# Patient Record
Sex: FEMALE | Race: WHITE | Employment: UNEMPLOYED | URBAN - METROPOLITAN AREA
[De-identification: names, ages, dates, MRNs, and addresses within clinical notes are randomized per-mention and may not be internally consistent; named-entity substitution may affect disease eponyms.]

---

## 2018-07-18 PROBLEM — N39.0 RECURRENT UTI: Status: ACTIVE | Noted: 2018-07-18

## 2018-07-18 PROBLEM — K59.00 CONSTIPATION, UNSPECIFIED CONSTIPATION TYPE: Status: ACTIVE | Noted: 2018-07-18

## 2018-08-06 PROCEDURE — 81015 MICROSCOPIC EXAM OF URINE: CPT | Performed by: PHYSICIAN ASSISTANT

## 2018-12-19 ENCOUNTER — ANESTHESIA EVENT (OUTPATIENT)
Dept: SURGERY | Facility: HOSPITAL | Age: 23
End: 2018-12-19

## 2018-12-19 ENCOUNTER — ANESTHESIA (OUTPATIENT)
Dept: SURGERY | Facility: HOSPITAL | Age: 23
End: 2018-12-19

## 2018-12-19 ENCOUNTER — HOSPITAL ENCOUNTER (INPATIENT)
Facility: HOSPITAL | Age: 23
LOS: 2 days | Discharge: HOME | DRG: 661 | End: 2018-12-21
Attending: UROLOGY | Admitting: UROLOGY
Payer: MEDICAID

## 2018-12-19 ENCOUNTER — APPOINTMENT (OUTPATIENT)
Dept: GENERAL RADIOLOGY | Facility: HOSPITAL | Age: 23
DRG: 661 | End: 2018-12-19
Attending: UROLOGY
Payer: MEDICAID

## 2018-12-19 DIAGNOSIS — N13.5 UPJ (URETEROPELVIC JUNCTION) OBSTRUCTION: ICD-10-CM

## 2018-12-19 LAB
ANTIBODY SCREEN: NEGATIVE
POCT LOT NUMBER: NORMAL
POCT URINE PREGNANCY: NEGATIVE
RH BLOOD TYPE: POSITIVE

## 2018-12-19 PROCEDURE — 0TJB8ZZ INSPECTION OF BLADDER, VIA NATURAL OR ARTIFICIAL OPENING ENDOSCOPIC: ICD-10-PCS | Performed by: UROLOGY

## 2018-12-19 PROCEDURE — 74420 UROGRAPHY RTRGR +-KUB: CPT | Performed by: UROLOGY

## 2018-12-19 PROCEDURE — 0TQ34ZZ REPAIR RIGHT KIDNEY PELVIS, PERCUTANEOUS ENDOSCOPIC APPROACH: ICD-10-PCS | Performed by: UROLOGY

## 2018-12-19 PROCEDURE — 0T764DZ DILATION OF RIGHT URETER WITH INTRALUMINAL DEVICE, PERCUTANEOUS ENDOSCOPIC APPROACH: ICD-10-PCS | Performed by: UROLOGY

## 2018-12-19 PROCEDURE — 86850 RBC ANTIBODY SCREEN: CPT | Performed by: UROLOGY

## 2018-12-19 PROCEDURE — 86900 BLOOD TYPING SEROLOGIC ABO: CPT | Performed by: UROLOGY

## 2018-12-19 PROCEDURE — 8E0W4CZ ROBOTIC ASSISTED PROCEDURE OF TRUNK REGION, PERCUTANEOUS ENDOSCOPIC APPROACH: ICD-10-PCS | Performed by: UROLOGY

## 2018-12-19 PROCEDURE — 81025 URINE PREGNANCY TEST: CPT | Performed by: UROLOGY

## 2018-12-19 PROCEDURE — 86901 BLOOD TYPING SEROLOGIC RH(D): CPT | Performed by: UROLOGY

## 2018-12-19 DEVICE — STENT URET 6F 24CM ULSMTH: Type: IMPLANTABLE DEVICE | Site: URETER | Status: FUNCTIONAL

## 2018-12-19 RX ORDER — ONDANSETRON 2 MG/ML
4 INJECTION INTRAMUSCULAR; INTRAVENOUS AS NEEDED
Status: DISCONTINUED | OUTPATIENT
Start: 2018-12-19 | End: 2018-12-19 | Stop reason: HOSPADM

## 2018-12-19 RX ORDER — HYDROMORPHONE HYDROCHLORIDE 1 MG/ML
INJECTION, SOLUTION INTRAMUSCULAR; INTRAVENOUS; SUBCUTANEOUS
Status: COMPLETED
Start: 2018-12-19 | End: 2018-12-19

## 2018-12-19 RX ORDER — ONDANSETRON 2 MG/ML
4 INJECTION INTRAMUSCULAR; INTRAVENOUS EVERY 6 HOURS PRN
Status: DISCONTINUED | OUTPATIENT
Start: 2018-12-19 | End: 2018-12-21

## 2018-12-19 RX ORDER — TRAMADOL HYDROCHLORIDE 50 MG/1
50 TABLET ORAL EVERY 6 HOURS PRN
Status: DISCONTINUED | OUTPATIENT
Start: 2018-12-19 | End: 2018-12-21

## 2018-12-19 RX ORDER — CEFAZOLIN SODIUM/WATER 2 G/20 ML
2 SYRINGE (ML) INTRAVENOUS EVERY 8 HOURS
Status: COMPLETED | OUTPATIENT
Start: 2018-12-20 | End: 2018-12-20

## 2018-12-19 RX ORDER — ENOXAPARIN SODIUM 100 MG/ML
40 INJECTION SUBCUTANEOUS DAILY
Status: DISCONTINUED | OUTPATIENT
Start: 2018-12-20 | End: 2018-12-21

## 2018-12-19 RX ORDER — HYDROCODONE BITARTRATE AND ACETAMINOPHEN 5; 325 MG/1; MG/1
1 TABLET ORAL AS NEEDED
Status: DISCONTINUED | OUTPATIENT
Start: 2018-12-19 | End: 2018-12-19 | Stop reason: HOSPADM

## 2018-12-19 RX ORDER — HYDROCODONE BITARTRATE AND ACETAMINOPHEN 5; 325 MG/1; MG/1
2 TABLET ORAL AS NEEDED
Status: DISCONTINUED | OUTPATIENT
Start: 2018-12-19 | End: 2018-12-19 | Stop reason: HOSPADM

## 2018-12-19 RX ORDER — NALOXONE HYDROCHLORIDE 0.4 MG/ML
80 INJECTION, SOLUTION INTRAMUSCULAR; INTRAVENOUS; SUBCUTANEOUS AS NEEDED
Status: DISCONTINUED | OUTPATIENT
Start: 2018-12-19 | End: 2018-12-19 | Stop reason: HOSPADM

## 2018-12-19 RX ORDER — ACETAMINOPHEN 325 MG/1
650 TABLET ORAL EVERY 4 HOURS PRN
Status: DISCONTINUED | OUTPATIENT
Start: 2018-12-19 | End: 2018-12-21

## 2018-12-19 RX ORDER — ACETAMINOPHEN 500 MG
500 TABLET ORAL EVERY 6 HOURS PRN
COMMUNITY
End: 2019-01-24

## 2018-12-19 RX ORDER — DOCUSATE SODIUM 100 MG/1
100 CAPSULE, LIQUID FILLED ORAL 2 TIMES DAILY
Status: DISCONTINUED | OUTPATIENT
Start: 2018-12-19 | End: 2018-12-21

## 2018-12-19 RX ORDER — MORPHINE SULFATE 4 MG/ML
4 INJECTION, SOLUTION INTRAMUSCULAR; INTRAVENOUS EVERY 2 HOUR PRN
Status: DISCONTINUED | OUTPATIENT
Start: 2018-12-19 | End: 2018-12-21

## 2018-12-19 RX ORDER — CEFAZOLIN SODIUM/WATER 2 G/20 ML
2 SYRINGE (ML) INTRAVENOUS ONCE
Status: COMPLETED | OUTPATIENT
Start: 2018-12-19 | End: 2018-12-19

## 2018-12-19 RX ORDER — ACETAMINOPHEN 10 MG/ML
15 INJECTION, SOLUTION INTRAVENOUS EVERY 6 HOURS PRN
Status: DISCONTINUED | OUTPATIENT
Start: 2018-12-19 | End: 2018-12-19 | Stop reason: HOSPADM

## 2018-12-19 RX ORDER — MORPHINE SULFATE 4 MG/ML
2 INJECTION, SOLUTION INTRAMUSCULAR; INTRAVENOUS EVERY 2 HOUR PRN
Status: DISCONTINUED | OUTPATIENT
Start: 2018-12-19 | End: 2018-12-21

## 2018-12-19 RX ORDER — ONDANSETRON 4 MG/1
4 TABLET, FILM COATED ORAL EVERY 6 HOURS PRN
Status: DISCONTINUED | OUTPATIENT
Start: 2018-12-19 | End: 2018-12-21

## 2018-12-19 RX ORDER — ACETAMINOPHEN 500 MG
1000 TABLET ORAL ONCE
Status: DISCONTINUED | OUTPATIENT
Start: 2018-12-19 | End: 2018-12-19

## 2018-12-19 RX ORDER — HEPARIN SODIUM 5000 [USP'U]/ML
5000 INJECTION, SOLUTION INTRAVENOUS; SUBCUTANEOUS ONCE
Status: COMPLETED | OUTPATIENT
Start: 2018-12-19 | End: 2018-12-19

## 2018-12-19 RX ORDER — ACETAMINOPHEN 10 MG/ML
INJECTION, SOLUTION INTRAVENOUS
Status: COMPLETED
Start: 2018-12-19 | End: 2018-12-19

## 2018-12-19 RX ORDER — SODIUM CHLORIDE 9 MG/ML
INJECTION, SOLUTION INTRAVENOUS CONTINUOUS
Status: DISCONTINUED | OUTPATIENT
Start: 2018-12-19 | End: 2018-12-21

## 2018-12-19 RX ORDER — HYDROMORPHONE HYDROCHLORIDE 1 MG/ML
0.4 INJECTION, SOLUTION INTRAMUSCULAR; INTRAVENOUS; SUBCUTANEOUS EVERY 5 MIN PRN
Status: DISCONTINUED | OUTPATIENT
Start: 2018-12-19 | End: 2018-12-19 | Stop reason: HOSPADM

## 2018-12-19 RX ORDER — SODIUM CHLORIDE, SODIUM LACTATE, POTASSIUM CHLORIDE, CALCIUM CHLORIDE 600; 310; 30; 20 MG/100ML; MG/100ML; MG/100ML; MG/100ML
INJECTION, SOLUTION INTRAVENOUS CONTINUOUS
Status: DISCONTINUED | OUTPATIENT
Start: 2018-12-19 | End: 2018-12-19

## 2018-12-19 RX ORDER — SODIUM CHLORIDE, SODIUM LACTATE, POTASSIUM CHLORIDE, CALCIUM CHLORIDE 600; 310; 30; 20 MG/100ML; MG/100ML; MG/100ML; MG/100ML
INJECTION, SOLUTION INTRAVENOUS CONTINUOUS
Status: DISCONTINUED | OUTPATIENT
Start: 2018-12-19 | End: 2018-12-19 | Stop reason: HOSPADM

## 2018-12-19 NOTE — ANESTHESIA PREPROCEDURE EVALUATION
PRE-OP EVALUATION    Patient Name: Millie Horowitz    Pre-op Diagnosis: UPJ (ureteropelvic junction) obstruction [N13.5]    Procedure(s):  XI ROBOTIC ASSISTED LAPAROSCOPIC RIGHT PYELOPLASTY, CYSTOSCOPY,   RIGHT RETROGRADE PYELOGRAM, RIGHT URETERAL S exam normal.                 Other findings            ASA: 1   Plan: general  NPO status verified and patient meets guidelines. Patient has not taken beta blockers in last 24 hours. Comment: Plan for general anesthetic with endotracheal tube.   Risks

## 2018-12-19 NOTE — H&P
[facility] Urology  H&P Note    Shanika Michael Patient Status:  Surgery Admit    1995 MRN [id number]   Location [address] [place] [address]ING Attending Kevan Cintron MD   Hosp Day # 0 PCP RACHEL Baumann DO     CC: right kg/m²   GENERAL: well developed, well nourished, no apparent distress  EYES: sclera non-icteric, no redness   MOUTH:  moist oral mucosa, no lesions  LUNGS: normal respiratory motion without distress  GI: Abdomen soft without organomegally, no tenderness, n

## 2018-12-20 LAB
ANION GAP SERPL CALC-SCNC: 9 MMOL/L (ref 0–18)
BUN BLD-MCNC: 8 MG/DL (ref 8–20)
BUN/CREAT SERPL: 16 (ref 10–20)
CALCIUM BLD-MCNC: 7.9 MG/DL (ref 8.3–10.3)
CHLORIDE SERPL-SCNC: 108 MMOL/L (ref 101–111)
CO2 SERPL-SCNC: 18 MMOL/L (ref 22–32)
CREAT BLD-MCNC: 0.5 MG/DL (ref 0.55–1.02)
ERYTHROCYTE [DISTWIDTH] IN BLOOD BY AUTOMATED COUNT: 11.5 % (ref 11.5–16)
GLUCOSE BLD-MCNC: 119 MG/DL (ref 70–99)
HCT VFR BLD AUTO: 30.6 % (ref 34–50)
HGB BLD-MCNC: 10.9 G/DL (ref 12–16)
MCH RBC QN AUTO: 31.9 PG (ref 27–33.2)
MCHC RBC AUTO-ENTMCNC: 35.6 G/DL (ref 31–37)
MCV RBC AUTO: 89.5 FL (ref 81–100)
OSMOLALITY SERPL CALC.SUM OF ELEC: 279 MOSM/KG (ref 275–295)
PLATELET # BLD AUTO: 170 10(3)UL (ref 150–450)
POTASSIUM SERPL-SCNC: 4 MMOL/L (ref 3.6–5.1)
RBC # BLD AUTO: 3.42 X10(6)UL (ref 3.8–5.1)
RED CELL DISTRIBUTION WIDTH-SD: 37.1 FL (ref 35.1–46.3)
SODIUM SERPL-SCNC: 135 MMOL/L (ref 136–144)
WBC # BLD AUTO: 6.3 X10(3) UL (ref 4–13)

## 2018-12-20 PROCEDURE — 80048 BASIC METABOLIC PNL TOTAL CA: CPT | Performed by: UROLOGY

## 2018-12-20 PROCEDURE — 85027 COMPLETE CBC AUTOMATED: CPT | Performed by: UROLOGY

## 2018-12-20 RX ORDER — TRAMADOL HYDROCHLORIDE 50 MG/1
50 TABLET ORAL EVERY 6 HOURS PRN
Qty: 20 TABLET | Refills: 0 | Status: SHIPPED | OUTPATIENT
Start: 2018-12-20 | End: 2018-12-21

## 2018-12-20 RX ORDER — PHENAZOPYRIDINE HYDROCHLORIDE 100 MG/1
100 TABLET, FILM COATED ORAL
Status: DISCONTINUED | OUTPATIENT
Start: 2018-12-20 | End: 2018-12-21

## 2018-12-20 RX ORDER — OXYBUTYNIN CHLORIDE 5 MG/1
5 TABLET ORAL 3 TIMES DAILY
Status: DISCONTINUED | OUTPATIENT
Start: 2018-12-20 | End: 2018-12-21

## 2018-12-20 RX ORDER — PHENAZOPYRIDINE HYDROCHLORIDE 100 MG/1
100 TABLET, FILM COATED ORAL
Qty: 15 TABLET | Refills: 1 | Status: SHIPPED | OUTPATIENT
Start: 2018-12-20 | End: 2019-01-24

## 2018-12-20 RX ORDER — PSEUDOEPHEDRINE HCL 30 MG
100 TABLET ORAL 2 TIMES DAILY
Qty: 30 CAPSULE | Refills: 0 | Status: SHIPPED | OUTPATIENT
Start: 2018-12-20 | End: 2019-01-24

## 2018-12-20 RX ORDER — METOCLOPRAMIDE HYDROCHLORIDE 5 MG/ML
10 INJECTION INTRAMUSCULAR; INTRAVENOUS EVERY 6 HOURS PRN
Status: DISCONTINUED | OUTPATIENT
Start: 2018-12-20 | End: 2018-12-21

## 2018-12-20 NOTE — PROGRESS NOTES
Pt resting in bedside chair. Vss. Pt attempted full liquid diet but had small green bile emesis after eating pudding. Pt had good relief of nausea earlier this am with zofran. Pt denies nausea after emesis. Abdomen soft. bs hypoactive on ausculation.  Pt de

## 2018-12-20 NOTE — PLAN OF CARE
Altered Communication/Language Barrier    • Patient/Family is able to understand and participate in their care Progressing        GENITOURINARY - ADULT    • Absence of urinary retention Progressing        PAIN - ADULT    • Verbalizes/displays adequate comf

## 2018-12-20 NOTE — DIETARY MALNUTRITION NOTE
[facility]    NUTRITION INITIAL ASSESSMENT    Pt meets malnutrition criteria.     CRITERIA FOR MALNUTRITION DIAGNOSIS:  Criteria for non-severe malnutrition diagnosis: chronic illness related to wt loss 10% in 6 months and energy intake less than75% fo lb 6.6 oz)  11/21/18 : 47.6 kg (105 lb)  09/20/18 : 50.8 kg (112 lb)  07/18/18 : 52.2 kg (115 lb)    NUTRITION:  Diet: Full Liquids  Oral Supplements: Ensure Enlive 1 x daily    FOOD/NUTRITION RELATED HISTORY:  Appetite: Poor  Intake: 0-25%  Intake Meeting

## 2018-12-20 NOTE — PROGRESS NOTES
[facility]  Urology Progress Note    Davion Neal Patient Status:  Observation    1995 MRN [id number]   [facility] 3NW-A Attending Rian Moritz, MD   Hosp Day # 0 PCP RACHEL Baumann [place]     Subjective:  Timbo Ridley course. Above discussed with nurse.     Blanca Jones P.A.-C  [facility] Urology  12/20/2018  7:19 AM

## 2018-12-20 NOTE — ANESTHESIA POSTPROCEDURE EVALUATION
[address] Patient Status:  Surgery Admit   Age/Gender 24year old female MRN [id number]   [facility] SURGERY Attending Kerry Eddy MD   Hosp Day # 0 PCP Lynda Lundy DO       Anesthesia Post-op Not

## 2018-12-20 NOTE — PROGRESS NOTES
NURSING ADMISSION NOTE      Patient admitted via PACU. Oriented to room. Safety precautions initiated. Bed in low position. Call light in reach. Patient is extremely drowsy, complaining of nausea gave zofran per MAR.

## 2018-12-20 NOTE — PAYOR COMM NOTE
--------------  ADMISSION REVIEW     Payor: [address] [place] [address]  Subscriber #:  [id number]  Authorization Number: N/A    Admit date: N/A  Admit time: N/A       Admitting Physician: Kevan Cintron MD  Attending Physician:  Kay Becerril •  HYDROcodone-acetaminophen (NORCO) 5-325 MG per tab 1 tablet, 1 tablet, Oral, PRN **OR** HYDROcodone-acetaminophen (NORCO) 5-325 MG per tab 2 tablet, 2 tablet, Oral, PRN  •  fentaNYL citrate (SUBLIMAZE) 0.05 MG/ML injection 25 mcg, 25 mcg, Intravenous, Q ceFAZolin sodium (ANCEF/KEFZOL) 2 GM/20ML premix IV syringe 2 g     Date Action Dose Route User    12/20/2018 0201 Given 2 g Intravenous Wing HANG Mike      docusate sodium (COLACE) cap 100 mg     Date Action Dose Route User    12/20/2018 0809 Given 100 Date Action Dose Route User    12/20/2018 0427 Given 4 mg Intravenous Carlos Porter RN    12/19/2018 2224 Given 4 mg Intravenous Carlos Porter RN      Oxybutynin Chloride [facility]) tab 5 mg     Date Action Dose Route User    12/20/2018 0809 Given 5 mg INDICATIONS FOR PROCEDURE:  The patient was referred for evaluation of right UPJ obstruction. Different treatment options were reviewed with the patient in great detail.   Patient elected to undergo a laparoscopic robotic-assisted right pyeloplasty after Through a periumbilical incision, a Veress needle was inserted into the peritoneum and pneumoperitoneum was obtained without complications. A 8-mm port was then placed through the same incision and a camera was introduced. No organ injury was observed. Electronically signed by Renetta Hughes MD at 12/19/2018  8:00 PM       Admission (Current) on 12/19/2018            Detailed Report        Chart Review: Note Routing History     No Routing History on File     PLEASE FAX DAYS CERTIFIED AND NEXT REVI

## 2018-12-20 NOTE — OPERATIVE REPORT
[facility] Urology       Operative Note      Jeannette Cruz Patient Status:  Surgery Admit    1995 MRN [id number]   [facility] SURGERY Attending Ramírez Anderson MD   Hosp Day # 0 PCP RACHEL MCDONALD,          DATE OF SURG pelvis was hydronephrotic and did not seem to drain appropriately and in timely manner. As a result, a 0.035 guide wire was then placed in the ureter and collecting system, followed by a 6 Fr x 24 cm double J stent.   The positioning was confirmed with live A  RICK drain was left in place. The skin incisions were approximated using 4-0 Monocryl subcuticular stitches. There were no complications during the surgery. I was present throughout the entirety of this procedure.       DISPOSITION:  The patient w

## 2018-12-21 VITALS
OXYGEN SATURATION: 98 % | HEART RATE: 73 BPM | WEIGHT: 101.44 LBS | DIASTOLIC BLOOD PRESSURE: 62 MMHG | RESPIRATION RATE: 16 BRPM | HEIGHT: 63 IN | BODY MASS INDEX: 17.97 KG/M2 | TEMPERATURE: 99 F | SYSTOLIC BLOOD PRESSURE: 108 MMHG

## 2018-12-21 LAB
ERYTHROCYTE [DISTWIDTH] IN BLOOD BY AUTOMATED COUNT: 11.9 % (ref 11.5–16)
HCT VFR BLD AUTO: 28.7 % (ref 34–50)
HGB BLD-MCNC: 10.1 G/DL (ref 12–16)
MCH RBC QN AUTO: 31.6 PG (ref 27–33.2)
MCHC RBC AUTO-ENTMCNC: 35.2 G/DL (ref 31–37)
MCV RBC AUTO: 89.7 FL (ref 81–100)
PLATELET # BLD AUTO: 148 10(3)UL (ref 150–450)
RBC # BLD AUTO: 3.2 X10(6)UL (ref 3.8–5.1)
RED CELL DISTRIBUTION WIDTH-SD: 38.6 FL (ref 35.1–46.3)
WBC # BLD AUTO: 5.3 X10(3) UL (ref 4–13)

## 2018-12-21 PROCEDURE — 85027 COMPLETE CBC AUTOMATED: CPT | Performed by: UROLOGY

## 2018-12-21 RX ORDER — HYDROCODONE BITARTRATE AND ACETAMINOPHEN 5; 325 MG/1; MG/1
1 TABLET ORAL EVERY 4 HOURS PRN
Status: DISCONTINUED | OUTPATIENT
Start: 2018-12-21 | End: 2018-12-21

## 2018-12-21 RX ORDER — HYDROCODONE BITARTRATE AND ACETAMINOPHEN 5; 325 MG/1; MG/1
2 TABLET ORAL EVERY 4 HOURS PRN
Status: DISCONTINUED | OUTPATIENT
Start: 2018-12-21 | End: 2018-12-21

## 2018-12-21 RX ORDER — OXYBUTYNIN CHLORIDE 5 MG/1
5 TABLET ORAL 3 TIMES DAILY
Qty: 90 TABLET | Refills: 0 | Status: SHIPPED | OUTPATIENT
Start: 2018-12-21 | End: 2019-01-24

## 2018-12-21 RX ORDER — IBUPROFEN 400 MG/1
400 TABLET ORAL EVERY 6 HOURS PRN
Status: DISCONTINUED | OUTPATIENT
Start: 2018-12-21 | End: 2018-12-21

## 2018-12-21 RX ORDER — HYDROCODONE BITARTRATE AND ACETAMINOPHEN 5; 325 MG/1; MG/1
1 TABLET ORAL EVERY 6 HOURS PRN
Qty: 20 TABLET | Refills: 0 | Status: SHIPPED | OUTPATIENT
Start: 2018-12-21 | End: 2019-01-24

## 2018-12-21 NOTE — PROGRESS NOTES
Pt d/c home. D/c instructions given to pt, pt's boyfriend & pt's father, including review of home meds, [facility], diet, hydration, activity, wound care & f/u care. Verbalized understanding of all instructions.   Left unit stable via

## 2018-12-21 NOTE — PAYOR COMM NOTE
--------------  CONTINUED STAY REVIEW    Payor: Zenon Escalera #:  [id number]  Authorization Number: N/A    Admit date: 12/19/18  Admit time: 200    Admitting Physician: Kyara Iraheta MD  Attending Physician:  Adiel August course.     Above discussed with nurse.     La Johansen P.A.-C  [facility] Urology  12/21/2018  7:53 AM                   Electronically signed by YAMILE Sexton at 12/21/2018  8:08 AM           MEDICATIONS ADMINISTERED IN LAST 1 DAY:  acetaminophen (Lovenia Anton Dose Route User    Discharged on 12/21/2018 12/21/2018 1222 Given 100 mg Oral Torie Tammi, RN    12/21/2018 1901 Given 100 mg Oral Torie Tammi, RN    12/20/2018 1659 Given 100 mg Oral Myrna Chi, RN      0.9%  NaCl infusion     Date Acti

## 2018-12-21 NOTE — PROGRESS NOTES
[facility]  Urology Progress Note    Sherine Kelly Patient Status:  Inpatient    1995 MRN [id number]   [facility] 3NW-A Attending Carlos Alberto [place] MD   [facility] Day # 2 PCP RACHEL Baumann [place]     Subjective:  Reyna Chr

## 2018-12-21 NOTE — PLAN OF CARE
Problem: Patient/Family Goals  Goal: Patient/Family Short Term Goal  Patient's Short Term Goal: 'get better'    Interventions:   - chika wessing here from urology this am.  New orders noted for motrin & norco.  Both given with relief  - See additional Care Consider OT/PT consult to assist with strengthening/mobility  - Encourage toileting schedule  Outcome: Adequate for Discharge  Up with steady gait    Problem: Altered Communication/Language Barrier  Goal: Patient/Family is able to understand and participat

## 2018-12-28 NOTE — CDS QUERY
Potential for Impaired Acid/Base Regulation  CLINICAL DOCUMENTATION CLARIFICATION FORM  Dear Doctor:  Clinical information (provided below) indicates impaired acid/base regulation.  For accurate ICD-10-CM code assignment to reflect severity of illness and r

## 2019-02-16 ENCOUNTER — HOSPITAL ENCOUNTER (EMERGENCY)
Facility: HOSPITAL | Age: 24
Discharge: HOME | End: 2019-02-16
Attending: EMERGENCY MEDICINE
Payer: MEDICAID

## 2019-02-16 ENCOUNTER — APPOINTMENT (OUTPATIENT)
Dept: CT IMAGING | Facility: HOSPITAL | Age: 24
End: 2019-02-16
Attending: EMERGENCY MEDICINE
Payer: MEDICAID

## 2019-02-16 VITALS
DIASTOLIC BLOOD PRESSURE: 58 MMHG | SYSTOLIC BLOOD PRESSURE: 101 MMHG | HEART RATE: 62 BPM | WEIGHT: 102 LBS | OXYGEN SATURATION: 97 % | TEMPERATURE: 98 F | HEIGHT: 63 IN | BODY MASS INDEX: 18.07 KG/M2 | RESPIRATION RATE: 18 BRPM

## 2019-02-16 DIAGNOSIS — R10.9 ABDOMINAL PAIN OF UNKNOWN ETIOLOGY: Primary | ICD-10-CM

## 2019-02-16 LAB
ALBUMIN SERPL-MCNC: 4.2 G/DL (ref 3.4–5)
ALBUMIN/GLOB SERPL: 1 {RATIO} (ref 1–2)
ALP LIVER SERPL-CCNC: 73 U/L (ref 37–98)
ALT SERPL-CCNC: 23 U/L (ref 13–56)
ANION GAP SERPL CALC-SCNC: 9 MMOL/L (ref 0–18)
AST SERPL-CCNC: 18 U/L (ref 15–37)
BASOPHILS # BLD AUTO: 0.03 X10(3) UL (ref 0–0.2)
BASOPHILS NFR BLD AUTO: 0.4 %
BILIRUB SERPL-MCNC: 0.3 MG/DL (ref 0.1–2)
BILIRUB UR QL STRIP.AUTO: NEGATIVE
BUN BLD-MCNC: 13 MG/DL (ref 7–18)
BUN/CREAT SERPL: 21.3 (ref 10–20)
CALCIUM BLD-MCNC: 9.6 MG/DL (ref 8.5–10.1)
CHLORIDE SERPL-SCNC: 104 MMOL/L (ref 98–107)
CLARITY UR REFRACT.AUTO: CLEAR
CO2 SERPL-SCNC: 26 MMOL/L (ref 21–32)
COLOR UR AUTO: YELLOW
CREAT BLD-MCNC: 0.61 MG/DL (ref 0.55–1.02)
DEPRECATED RDW RBC AUTO: 40.2 FL (ref 35.1–46.3)
EOSINOPHIL # BLD AUTO: 0.17 X10(3) UL (ref 0–0.7)
EOSINOPHIL NFR BLD AUTO: 2.5 %
ERYTHROCYTE [DISTWIDTH] IN BLOOD BY AUTOMATED COUNT: 12.3 % (ref 11–15)
GLOBULIN PLAS-MCNC: 4.2 G/DL (ref 2.8–4.4)
GLUCOSE BLD-MCNC: 95 MG/DL (ref 70–99)
GLUCOSE UR STRIP.AUTO-MCNC: NEGATIVE MG/DL
HCT VFR BLD AUTO: 39.8 % (ref 35–48)
HGB BLD-MCNC: 13.7 G/DL (ref 12–16)
IMM GRANULOCYTES # BLD AUTO: 0.01 X10(3) UL (ref 0–1)
IMM GRANULOCYTES NFR BLD: 0.1 %
KETONES UR STRIP.AUTO-MCNC: NEGATIVE MG/DL
LEUKOCYTE ESTERASE UR QL STRIP.AUTO: NEGATIVE
LIPASE SERPL-CCNC: 146 U/L (ref 73–393)
LYMPHOCYTES # BLD AUTO: 1.51 X10(3) UL (ref 1–4)
LYMPHOCYTES NFR BLD AUTO: 22.6 %
M PROTEIN MFR SERPL ELPH: 8.4 G/DL (ref 6.4–8.2)
MCH RBC QN AUTO: 31 PG (ref 26–34)
MCHC RBC AUTO-ENTMCNC: 34.4 G/DL (ref 31–37)
MCV RBC AUTO: 90 FL (ref 80–100)
MONOCYTES # BLD AUTO: 0.48 X10(3) UL (ref 0.1–1)
MONOCYTES NFR BLD AUTO: 7.2 %
NEUTROPHILS # BLD AUTO: 4.48 X10 (3) UL (ref 1.5–7.7)
NEUTROPHILS # BLD AUTO: 4.48 X10(3) UL (ref 1.5–7.7)
NEUTROPHILS NFR BLD AUTO: 67.2 %
NITRITE UR QL STRIP.AUTO: NEGATIVE
OSMOLALITY SERPL CALC.SUM OF ELEC: 288 MOSM/KG (ref 275–295)
PH UR STRIP.AUTO: 7 [PH] (ref 4.5–8)
PLATELET # BLD AUTO: 214 10(3)UL (ref 150–450)
POCT LOT NUMBER: NORMAL
POCT URINE PREGNANCY: NEGATIVE
POTASSIUM SERPL-SCNC: 4.8 MMOL/L (ref 3.5–5.1)
PROT UR STRIP.AUTO-MCNC: NEGATIVE MG/DL
RBC # BLD AUTO: 4.42 X10(6)UL (ref 3.8–5.3)
RBC UR QL AUTO: NEGATIVE
SODIUM SERPL-SCNC: 139 MMOL/L (ref 136–145)
SP GR UR STRIP.AUTO: 1.01 (ref 1–1.03)
UROBILINOGEN UR STRIP.AUTO-MCNC: <2 MG/DL
WBC # BLD AUTO: 6.7 X10(3) UL (ref 4–11)

## 2019-02-16 PROCEDURE — 96375 TX/PRO/DX INJ NEW DRUG ADDON: CPT

## 2019-02-16 PROCEDURE — 99285 EMERGENCY DEPT VISIT HI MDM: CPT

## 2019-02-16 PROCEDURE — 96374 THER/PROPH/DIAG INJ IV PUSH: CPT

## 2019-02-16 PROCEDURE — 96361 HYDRATE IV INFUSION ADD-ON: CPT

## 2019-02-16 PROCEDURE — 74176 CT ABD & PELVIS W/O CONTRAST: CPT | Performed by: EMERGENCY MEDICINE

## 2019-02-16 PROCEDURE — 81025 URINE PREGNANCY TEST: CPT

## 2019-02-16 PROCEDURE — 85025 COMPLETE CBC W/AUTO DIFF WBC: CPT | Performed by: EMERGENCY MEDICINE

## 2019-02-16 PROCEDURE — 83690 ASSAY OF LIPASE: CPT | Performed by: EMERGENCY MEDICINE

## 2019-02-16 PROCEDURE — 80053 COMPREHEN METABOLIC PANEL: CPT | Performed by: EMERGENCY MEDICINE

## 2019-02-16 PROCEDURE — 99284 EMERGENCY DEPT VISIT MOD MDM: CPT

## 2019-02-16 PROCEDURE — 81003 URINALYSIS AUTO W/O SCOPE: CPT | Performed by: EMERGENCY MEDICINE

## 2019-02-16 RX ORDER — MORPHINE SULFATE 4 MG/ML
4 INJECTION, SOLUTION INTRAMUSCULAR; INTRAVENOUS ONCE
Status: COMPLETED | OUTPATIENT
Start: 2019-02-16 | End: 2019-02-16

## 2019-02-16 RX ORDER — ONDANSETRON 2 MG/ML
4 INJECTION INTRAMUSCULAR; INTRAVENOUS ONCE
Status: COMPLETED | OUTPATIENT
Start: 2019-02-16 | End: 2019-02-16

## 2019-02-16 RX ORDER — SODIUM CHLORIDE 9 MG/ML
INJECTION, SOLUTION INTRAVENOUS CONTINUOUS
Status: DISCONTINUED | OUTPATIENT
Start: 2019-02-16 | End: 2019-02-16

## 2019-02-16 RX ORDER — DIAZEPAM 2 MG/1
2 TABLET ORAL 3 TIMES DAILY PRN
Qty: 10 TABLET | Refills: 0 | Status: SHIPPED | OUTPATIENT
Start: 2019-02-16 | End: 2019-02-23

## 2019-02-16 NOTE — ED PROVIDER NOTES
Patient Seen in: [facility] Emergency Department    History   Patient presents with:  Abdomen/Flank Pain (GI/)    Stated Complaint: abd pain    HPI    78-year-old white female who presents to the emergency room today for complaint of abdominal lazarus 98.3 °F (36.8 °C) (Oral)   Resp 16   Ht 160 cm (5' 3\")   Wt 46.3 kg   LMP 01/21/2019 (Approximate)   SpO2 100%   BMI 18.07 kg/m²         Physical Exam    Well-developed well-nourished female who is sitting on the gurney, she is awake and alert and appears lesion.    BILIARY:  No visible dilatation or calcification.    PANCREAS:  No lesion, fluid collection, ductal dilatation, or atrophy.    SPLEEN:  No enlargement or focal lesion.    KIDNEYS:  Mild to moderate dilatation the right renal collecting system an stents removed. They recommend she be placed on some Valium which may help. She is advised to continue Tylenol for pain. Follow-up with urology on Monday. Return if pains worsen she will fever vomiting or new complaints.             Disposition and Plan

## 2019-02-16 NOTE — ED INITIAL ASSESSMENT (HPI)
Abdominal pain - kidney surgery (Dec 19); per patient, stent removed Monday and now with lots of abdominal pain

## 2019-04-25 ENCOUNTER — APPOINTMENT (OUTPATIENT)
Dept: GENERAL RADIOLOGY | Age: 24
End: 2019-04-25
Attending: FAMILY MEDICINE
Payer: MEDICAID

## 2019-04-25 ENCOUNTER — HOSPITAL ENCOUNTER (OUTPATIENT)
Age: 24
Discharge: HOME | End: 2019-04-25
Attending: FAMILY MEDICINE
Payer: MEDICAID

## 2019-04-25 VITALS
HEIGHT: 63 IN | BODY MASS INDEX: 17.89 KG/M2 | HEART RATE: 81 BPM | RESPIRATION RATE: 16 BRPM | DIASTOLIC BLOOD PRESSURE: 64 MMHG | TEMPERATURE: 99 F | SYSTOLIC BLOOD PRESSURE: 107 MMHG | OXYGEN SATURATION: 100 % | WEIGHT: 101 LBS

## 2019-04-25 DIAGNOSIS — R07.9 CHEST PAIN OF UNCERTAIN ETIOLOGY: Primary | ICD-10-CM

## 2019-04-25 PROCEDURE — 80047 BASIC METABLC PNL IONIZED CA: CPT

## 2019-04-25 PROCEDURE — 99205 OFFICE O/P NEW HI 60 MIN: CPT

## 2019-04-25 PROCEDURE — 84484 ASSAY OF TROPONIN QUANT: CPT

## 2019-04-25 PROCEDURE — 93010 ELECTROCARDIOGRAM REPORT: CPT | Performed by: INTERNAL MEDICINE

## 2019-04-25 PROCEDURE — 36415 COLL VENOUS BLD VENIPUNCTURE: CPT

## 2019-04-25 PROCEDURE — 99215 OFFICE O/P EST HI 40 MIN: CPT

## 2019-04-25 PROCEDURE — 85378 FIBRIN DEGRADE SEMIQUANT: CPT | Performed by: FAMILY MEDICINE

## 2019-04-25 PROCEDURE — 93005 ELECTROCARDIOGRAM TRACING: CPT

## 2019-04-25 PROCEDURE — 85025 COMPLETE CBC W/AUTO DIFF WBC: CPT | Performed by: FAMILY MEDICINE

## 2019-04-25 PROCEDURE — 71046 X-RAY EXAM CHEST 2 VIEWS: CPT | Performed by: FAMILY MEDICINE

## 2019-04-25 PROCEDURE — 93010 ELECTROCARDIOGRAM REPORT: CPT

## 2019-04-25 RX ORDER — METHYLPREDNISOLONE 4 MG/1
TABLET ORAL
Qty: 1 PACKAGE | Refills: 0 | Status: SHIPPED | OUTPATIENT
Start: 2019-04-25

## 2019-04-25 RX ORDER — NAPROXEN 500 MG/1
500 TABLET ORAL 2 TIMES DAILY PRN
Qty: 20 TABLET | Refills: 0 | Status: SHIPPED | OUTPATIENT
Start: 2019-04-25 | End: 2019-04-25

## 2019-04-25 NOTE — ED PROVIDER NOTES
Patient Seen in: [facility]    History   Patient presents with:  Chest Pain Angina (cardiovascular)    Stated Complaint: CHEST PAIN- 2 DAYS     HPI    27-year-old female with previous history of seasonal allergies presents Exam    Patient is alert oriented ×3 in no acute distress   conjunctiva clear no icterus  Mild nasal congestion  Oropharynx no erythema, no exudates:   Neck supple full range of motion,  Lungs clear to auscultation bilaterally  Heart S1-S2 heard no murmurs 12:41 pm    Follow-up:  Alda Vazquez DO  [address] [place]  [phone]    Schedule an appointment as soon as possible for a visit in 1 week          Medications Prescribed:  Discharge Medication List as of 4/25/201

## 2019-04-25 NOTE — ED INITIAL ASSESSMENT (HPI)
C/o intermittent pain to upper left side of chest x 3 days. Worse with deep breaths. Cough at times.

## 2022-04-01 ENCOUNTER — HOSPITAL ENCOUNTER (OUTPATIENT)
Age: 27
Discharge: HOME | End: 2022-04-01
Payer: MEDICAID

## 2022-04-01 VITALS
BODY MASS INDEX: 19.49 KG/M2 | SYSTOLIC BLOOD PRESSURE: 111 MMHG | DIASTOLIC BLOOD PRESSURE: 68 MMHG | WEIGHT: 110 LBS | RESPIRATION RATE: 16 BRPM | TEMPERATURE: 99 F | OXYGEN SATURATION: 99 % | HEIGHT: 63 IN | HEART RATE: 98 BPM

## 2022-04-01 DIAGNOSIS — J01.00 ACUTE MAXILLARY SINUSITIS, RECURRENCE NOT SPECIFIED: Primary | ICD-10-CM

## 2022-04-01 LAB — SARS-COV-2 RNA RESP QL NAA+PROBE: NOT DETECTED

## 2022-04-01 PROCEDURE — 99203 OFFICE O/P NEW LOW 30 MIN: CPT | Performed by: PHYSICIAN ASSISTANT

## 2022-04-01 PROCEDURE — U0002 COVID-19 LAB TEST NON-CDC: HCPCS | Performed by: PHYSICIAN ASSISTANT

## 2022-04-01 RX ORDER — AMOXICILLIN AND CLAVULANATE POTASSIUM 875; 125 MG/1; MG/1
1 TABLET, FILM COATED ORAL 2 TIMES DAILY
Qty: 20 TABLET | Refills: 0 | Status: SHIPPED | OUTPATIENT
Start: 2022-04-01 | End: 2022-04-11

## 2024-05-06 ENCOUNTER — OFFICE VISIT (OUTPATIENT)
Dept: SURGERY | Facility: CLINIC | Age: 29
End: 2024-05-06
Payer: MEDICAID

## 2024-05-06 DIAGNOSIS — N13.5 UPJ OBSTRUCTION, ACQUIRED: ICD-10-CM

## 2024-05-06 DIAGNOSIS — N20.0 NEPHROLITHIASIS: Primary | ICD-10-CM

## 2024-05-06 DIAGNOSIS — N39.0 RECURRENT UTI: ICD-10-CM

## 2024-05-06 LAB
APPEARANCE: CLEAR
BILIRUBIN: NEGATIVE
GLUCOSE (URINE DIPSTICK): NEGATIVE MG/DL
KETONES (URINE DIPSTICK): NEGATIVE MG/DL
LEUKOCYTES: NEGATIVE
MULTISTIX LOT#: ABNORMAL NUMERIC
NITRITE, URINE: NEGATIVE
PH, URINE: 5.5 (ref 4.5–8)
PROTEIN (URINE DIPSTICK): NEGATIVE MG/DL
SPECIFIC GRAVITY: >=1.03 (ref 1–1.03)
URINE-COLOR: YELLOW
UROBILINOGEN,SEMI-QN: 0.2 MG/DL (ref 0–1.9)

## 2024-05-06 PROCEDURE — 81003 URINALYSIS AUTO W/O SCOPE: CPT | Performed by: PHYSICIAN ASSISTANT

## 2024-05-06 PROCEDURE — 99204 OFFICE O/P NEW MOD 45 MIN: CPT | Performed by: PHYSICIAN ASSISTANT

## 2024-05-06 NOTE — PROGRESS NOTES
[facility]    Urology Consult Note    History of Present Illness:   Patient is a 28 year old healthy female with hx of recurrent UTI, UPJO, who presents today for consultation/second opinion regarding nephrolithiasis.    Patient historically seen by myself at [facility] Urology in 2018 for Denia. Ultrasound performed at that time that diagnosed right UPJ obstruction. She had follow up contrast imaging at [facility] that was reviewed by Dr. Nita Corrales that showed delayed contrast emptying from right side c/w UPJO. She underwent right pyeloplasty 12/19/18 with him. Her stent was removed 2/11/2019. Follow up imaging showed mild residual prominence of the right renal pelvis.     She did well for quite a few years without any infections. Over the last 6 months started experiencing recurrent UTIs again. Was sen approximately 1 month ago for recurrent UTI again by [facility]  service and CT scan was ordered for evaluation. CT scan 4/30/24 showed 5mm left lower pole stone and 2 mm RK stone.     Typical UTI symptoms - urinary frequency, urgency, pelvic pain. No gross hematuria. No fevers. No pyelonephritis. No prior history of stones.   No constipation.   Fluid intake 1 bottle water daily, 12-16 oz of coffee, occasional juice.   Low salt diet.     History: 28-year-old female with nephrolithiasis.     Technique: CT of the abdomen and pelvis was performed without intravenous contrast. Oral contrast was not given. Automatic exposure control was used for radiation dose reduction.     Dose (Total exam DLP): 378 mGy-cm     Comparison: Abdomen/pelvis CT of 10/24/2018         Findings: A 2 mm nonobstructing calculus is seen in the lower pole of the right kidney.     A thin 5 mm long calcification is seen in the lower pole of the left kidney. This may represent a nonobstructing stone or possibly 2 adjacent stones.     No other urinary tract calculus is seen.     No hydronephrosis or  perinephric stranding is seen.     The bladder is grossly unremarkable for a noncontrast exam.     The liver, spleen, pancreas, and adrenal glands are grossly unremarkable for a noncontrast exam. The uterus is normal in size.     Small bowel and colon are normal in caliber. The appendix is difficult to evaluate without intravenous contrast, but a small visible segment appears to be normal in caliber.     Trace free pelvic fluid is likely physiologic.     No definite lymphadenopathy is seen.     Lung bases are clear.         Opinion:   1. Bilateral nephrolithiasis.   2. No hydronephrosis.     Created by: Marshall Neal MD   Signed by: Marshall Neal MD   Signed on: 4/30/2024 4:15 PM   Location: [facility]       HISTORY:  Past Medical History:    Personal history UTI    Ureteropelvic junction (UPJ) obstruction, right      Past Surgical History:   Procedure Laterality Date    Other surgical history  12/19/2018    XI ROBOTIC ASSISTED LAPAROSCOPIC RIGHT PYELOPLASTY, CYSTOSCOPY,     Other surgical history  02/11/2019    Cystoscopy/Stet Removal- Dr. Corrales       Family History   Problem Relation Age of Onset    Colon Cancer Maternal Grandfather       Social History:   Social History     Socioeconomic History    Marital status:    Tobacco Use    Smoking status: Never    Smokeless tobacco: Never   Vaping Use    Vaping status: Never Used   Substance and Sexual Activity    Alcohol use: Not Currently     Comment: social/rare    Drug use: No        Allergies  No Known Allergies    Review of Systems:   A 10-point review of systems was completed and is negative other than as noted above.    Physical Exam:   There were no vitals taken for this visit.    GENERAL APPEARANCE: well developed, well nourished, in no acute distress  NEUROLOGIC: no localizing neurologic signs, alert and oriented x 3, converses appropriately  HEAD: atraumatic, normocephalic  EYES: sclera non-icteric  ORAL CAVITY: mucosa moist  NECK/THYROID: no obvious masses  or goiter  LUNGS: non-labored breathing  EXTREMITIES: warm, well-perfused. No clubbing, cyanosis or edema.  SKIN: no obvious rashes    Results:     Laboratory Data:  Lab Results   Component Value Date    WBC 6.7 02/16/2019    HGB 13.7 02/16/2019    .0 02/16/2019     Lab Results   Component Value Date     02/16/2019    K 4.8 02/16/2019     02/16/2019    CO2 26.0 02/16/2019    BUN 13 02/16/2019    GLU 95 02/16/2019    GFRAA 147 02/16/2019    AST 18 02/16/2019    ALT 23 02/16/2019    TP 8.4 (H) 02/16/2019    ALB 4.2 02/16/2019    CA 9.6 02/16/2019       Urinalysis Results (last three years):  Recent Labs     05/06/24  1551   SPECGRAVITY >=1.030   PHURINE 5.5   NITRITE Negative       Urine Culture Results (last three years):  No results found for: \"URINECUL\"    Imaging  No results found.      Impression:     Patient is a 28 year old healthy female with hx of recurrent UTI, UPJO (s/p pyeloplasty), who presents today for consultation/second opinion regarding nephrolithiasis.    Reviewed CT scan report with patient - no hydronephrosis (s/p pyeloplasty), small stones  No indication for surgical intervention at this time  We discussed stone prevention strategies at today's visit and I provided and reviewed educational materials for this. I recommend drinking at least 40-60 ounces of water per day or enough water to keep urine clear. I also recommend the patient avoid a high sodium diet. Add citrate to water daily.  Finally we discussed obtaining a 24 h urine for stone prevention and the patient would  like to do this.    We also reviewed factors with recurrent UTI  We reviewed cystitis tx and prevention strategies at today's visit and I provided educational materials for this. Discussed dietery and behavioral issues including cranberry / extract pills / supplements, fluids, voiding technique, post coital hygiene. Double voiding, bending over after void to completely empty. I encouraged the patient to drink  at least 40-60 oz water per day or enough to keep urine clear.     We reviewed her CT scan report also in relation to her UPJ obstruction s/p pyeloplasty. No evidence of any hydronephrosis. No indication for contrast study at this time.       Recommendations:  As above. 24 hour urine collection. KUB in 6 months. D Mannose 1 gram daily.  Pending results above will make further recommendations. Could consider cystoscopy if ongoing infections despite increased fluid intake and D Mannose.    Thank you very much for this consult. Please call if there are any questions or concerns.     Tamera Landaverde PA-C  Urology  [facility]    Date: 5/6/2024

## 2024-05-06 NOTE — PATIENT INSTRUCTIONS
General Recommendations to Avoid Future Kidney Stones    1. Drink enough water to produce 2 liters of clear urine daily. You may need to use a container at first to measure how much you are actually producing and increase your intake accordingly. Try to start the day off with a large glass of water as we all wake up dehydrated in the morning.    2. Add lemon or lime juice to your water. These juices contain citrate which naturally inhibits stone formation. An easy way to do this is using sugar free lemonade mix (ie Crystal Light or True Lemon (if you prefer to avoid aspartame))    3. Avoid salty foods such as prepackaged and fast foods, and do not add salt to foods. Try to limit sodium intake to 2500mg maximum.     4. Decrease phosphorus (soda) and caffeine.     5. Limit intake of the following group of foods to one serving daily: spinach, nuts (especially almonds), tea (especially black tea), chocolate, and potatoes.    6. Limit intake of Vitamin C supplements to less than 1000 mg daily.    7. Limit intake of animal protein (beef, chicken, turkey, fish, pork) to one serving daily. A good rule for portion size is no more meat than will fit in the palm of your hand.    8. Limit roasted nuts to 2-3 servings per week.    9.  Maintain 1-2 servings of dairy products daily (1 serving = 1 glass of milk, 2 slices of cheese, 1 scoop of ice cream, or 1 cup of yogurt). Try to decrease cheese intake as it may increase kidney stone risk compared to other dairy products. Do not eliminate calcium from your diet as it is necessary for bone health.   -Females should try not to exceed 500mg per day  -Males should try not to exceed 750mg per day    10. Take a daily B complex vitamin or 100mg vitamin B6 daily. This may help to decrease oxalate in the urine.     11. Take a daily magnesium supplement of 300mg daily. Magnesium binds to oxalate in the same way that calcium does but is more soluble.  Magnesium will take the place of calcium  when binding with oxalate and be less prone to form stones.    12. Eat a low fat diet and exercise at least 30 minutes 3 times per week to try and maintain your ideal body weight. Being overweight is a risk factor for kidney stones too!             Patient Instructions for  24 Hour Urine Kidney Stone Collection System     Supplies:   24-hour Urine Collection Kit for kidney stone analysis is required.  The kit may be picked up at the lab.     Collection:  Empty your bladder completely upon awakening in the morning and flush this urine. This void is not to be saved.     All urine passed during the rest of the day and night for the next 24 hours must be poured in the orange container. Urine can be collected in another clean container and carefully poured into the 24-hour collection container or you can urinate directly into the large orange container from the collection kit. This container must be stored in a refrigerator or in a cool location.     Exactly 24-hours after beginning the urine collection, empty your last voided specimen one last time into the container.     Immediately upon completion of your 24-hour collection, you should tighten lid of the orange container and shake vigorously for one minute. A good mix will assure accurate test results.     Note: High volume urine output  Patients who think they produce a large amount of urine may require more than one large orange collection container. Please ask the lab for two complete kits home.      Collect urine in the first container until it is ¾ full and then begin filling the second container.     Returning the container:   Drop off  the orange container and the laboratory orders at the [facility] lab facility. The container should be dropped off no later than 4 hours after completion of the urine collection if possible.     [facility] Hours (please note that hours are subject to change):  - Weekdays: 6 am - 8 pm  - Saturday: 6 am - 3 pm  - Closed on  Sunday  - You may go to the ER to drop off your specimen if the outpatient lab is closed.  - If you are using another lab for your collection please check their hours of operation.     Results:  Results take 10-14 days.  We will contact you when results are back.     Urinary tract infections can affect your general health and your quality of life.  Some UTI’s can be prevented.  Here are some suggestions that my help prevent frequent UTI’s.     Good Hygiene: Always wipe front to back.  Don’t use perfumed soaps.  Plain soaps like Ivory are the best.  Don’t use washcloths.  Place soap directly on your hands and clean the genital area.  Rinse thoroughly.  Soap can be very irritating to the vaginal mucosa.     Urination: Urinate every 2-3 hours during the day.  Empty your bladder just before going to bed and  first thing when you wake up.  Make sure you go to the bathroom when you have a strong urge. Don't hover over the toilet to urinate.  The bladder may not be completely emptying when using this technique.  Sometimes you may need to \"double-void\".  After you finish urinating, stand up, then sit back down to urinate again.     Clothing: Wear cotton underwear. Change daily.  Avoid tight jeans.       [place]: Sometimes UTI’s are related to intercourse.  Wash genital area before and rinse afterwards.  Empty your bladder before and after intercourse.  Use of vaginal lubricants may be helpful.  Condoms and some lubricants may be irritating to the vaginal mucosa.  Spermicide should be avoided.  Talk to your doctor, you may require a suppressive antibiotic to take after intercourse.     Supplements: Take Vitamin C 500 or 1000mg daily.  Cranberry pills 400mg daily or if symptomatic 400mg two times per day.  Eat yogurt or consider taking a probiotic.  D-mannose 1 gram is another supplement that can help prevent infections.  This one is harder to find, but can be found at stores such as [employer], [employer], or a  specialized vitamin store.  Fluid intake should be at least 48oz daily with the goal of 64oz daily.  Water is preferable.      Constipation: Constipation has been linked to UTI’s.  You should be having a soft BM daily.  Dietary fiber should be between 20-25 grams daily.  Flaxseed, All-Bran cereal, Fiber One bars, fruits and vegetables are a good sources of fiber.  Alternatively, Metamucil can be used daily.  Gentle laxatives and stool softeners may be added on a daily or as needed basis if necessary (Miralax, Colace, Pericolace).      Vaginal creams and moisturizers: It may be recommended you try vaginal creams, moisturizers or lubricants as a prevention method.  Over-the-Counter products:  Replens:  1 applicator three times per week  Luvena prebiotic vaginal moisturizer and lubricant:  package of 6.  1 tube every three days at bedtime.  Helps restore pH balance, decrease vaginal dryness, odor and itchiness.  KY liquibeads  KY silk - moisture enhancer  MeAgain Vaginal Moisturizer.  8 per package.  Use 1 daily for 7 days then 1 daily two times per week.  Astroglide  Prescriptions:  Estrace Cream  Premarin Cream  E-string     Prescription medications: Your doctor may recommend daily or as needed prescription medications including antibiotics to prevent urinary tract infection as well.

## 2024-05-17 PROCEDURE — 84392 ASSAY OF URINE SULFATE: CPT

## 2024-05-17 PROCEDURE — 82340 ASSAY OF CALCIUM IN URINE: CPT

## 2024-05-17 PROCEDURE — 84133 ASSAY OF URINE POTASSIUM: CPT

## 2024-05-17 PROCEDURE — 84560 ASSAY OF URINE/URIC ACID: CPT

## 2024-05-17 PROCEDURE — 82436 ASSAY OF URINE CHLORIDE: CPT

## 2024-05-17 PROCEDURE — 83735 ASSAY OF MAGNESIUM: CPT

## 2024-05-17 PROCEDURE — 82507 ASSAY OF CITRATE: CPT

## 2024-05-17 PROCEDURE — 84105 ASSAY OF URINE PHOSPHORUS: CPT

## 2024-05-17 PROCEDURE — 83986 ASSAY PH BODY FLUID NOS: CPT

## 2024-05-17 PROCEDURE — 83945 ASSAY OF OXALATE: CPT

## 2024-05-17 PROCEDURE — 84300 ASSAY OF URINE SODIUM: CPT

## 2024-05-18 ENCOUNTER — LAB ENCOUNTER (OUTPATIENT)
Dept: LAB | Age: 29
End: 2024-05-18
Attending: PHYSICIAN ASSISTANT

## 2024-05-18 DIAGNOSIS — N20.0 NEPHROLITHIASIS: ICD-10-CM

## 2024-05-24 LAB
AMMONIA, URINE: 23 MMOL/24 HR
CHLORIDE URINE: 57 MMOL/24 HR
CITRIC ACID(CITRATE): 657 MG/24 HR
CREATININE, URINE: 679 MG/24 HR
CREATININE/KG BODY WEIGHT, URINE: 13.9 MG/24 HR/KG
CREATININE/KG BODY WEIGHT, URINE: 13.9 MG/24 HR/KG
LC CALCIUM OXALATE SATURATION, URINE: 6.57
LC CALCIUM PHOSPHATE SATURATION, URINE: 0.47
LC CALCIUM, URINE: 43 MG/24 HR
LC CALCIUM/CREATININE RATIO, URINE: 63 MG/G CREAT
LC CALCIUM/KG BODY WEIGHT, URINE: 0.9 MG/24 HR/KG
MAGNESIUM, URINE: 34 MG/24 HR
OXALATES, URINE: 14 MG/24 HR
PH, 24 HR URINE: 5.47
PHOSPHORUS, URINE: 467 MG/24 HR
POTASSIUM, URINE: 33 MMOL/24 HR
PROTEIN CATABOLIC RATE, URINE: 0.8 G/KG/24 HR
PROTEIN CATABOLIC RATE, URINE: 0.8 G/KG/24 HR
SODIUM, URINE: 43 MMOL/24 HR
SULFATE, URINE: 16 MEQ/24 HR
UREA NITROGEN, URINE: 4.43 G/24 HR
UREA NITROGEN, URINE: 4.43 G/24 HR
URIC ACID SATURATION, URINE: 3.29
URIC ACID SATURATION, URINE: 3.29
URIC ACID, URINE: 302 MG/24 HR
URINE VOLUME (PRESERVATIVE): 430 ML/24 HR

## 2024-07-09 ENCOUNTER — HOSPITAL ENCOUNTER (OUTPATIENT)
Dept: GENERAL RADIOLOGY | Age: 29
Discharge: HOME | End: 2024-07-09
Attending: PHYSICIAN ASSISTANT
Payer: MEDICAID

## 2024-07-09 DIAGNOSIS — N20.0 NEPHROLITHIASIS: ICD-10-CM

## 2024-07-09 PROCEDURE — 74018 RADEX ABDOMEN 1 VIEW: CPT | Performed by: PHYSICIAN ASSISTANT

## 2024-07-10 ENCOUNTER — TELEPHONE (OUTPATIENT)
Dept: SURGERY | Facility: CLINIC | Age: 29
End: 2024-07-10

## (undated) DEVICE — CLIP HEMOLOK MEDIUM GREEN

## (undated) DEVICE — TRI-LUMEN FILTERED TUBE SET WITH ACTIVATED CHARCOAL FILTER: Brand: AIRSEAL

## (undated) DEVICE — DRAIN BLAKE ROUND 15FR

## (undated) DEVICE — COVER,MAYO STAND,STERILE: Brand: MEDLINE

## (undated) DEVICE — SUTURE VICRYL 4-0 RB-1

## (undated) DEVICE — SUTURE MONOCRYL 4-0 PS-2

## (undated) DEVICE — VISUALIZATION SYSTEM: Brand: CLEARIFY

## (undated) DEVICE — SUTURE VICRYL 0

## (undated) DEVICE — PAD SACRAL SPAN AID

## (undated) DEVICE — BLADELESS OBTURATOR: Brand: WECK VISTA

## (undated) DEVICE — CANNULA SEAL

## (undated) DEVICE — INTENDED TO BE USED TO OCCLUDE, RETRACT AND IDENTIFY ARTERIES, VEINS, TENDONS AND NERVES IN SURGICAL PROCEDURES: Brand: STERION®  VESSEL LOOP

## (undated) DEVICE — FENESTRATED BIPOLAR FORCEPS: Brand: ENDOWRIST

## (undated) DEVICE — SUTURE ETHILON 2-0 FS

## (undated) DEVICE — SUTURE SILK 2-0 FS

## (undated) DEVICE — TIGERTAIL 5F FLXTIP 70CM

## (undated) DEVICE — LARGE NEEDLE DRIVER: Brand: ENDOWRIST

## (undated) DEVICE — DERMABOND LIQUID ADHESIVE

## (undated) DEVICE — INSUFFLATION NEEDLE TO ESTABLISH PNEUMOPERITONEUM.: Brand: INSUFFLATION NEEDLE

## (undated) DEVICE — CAUTERY PENCIL

## (undated) DEVICE — STERILE POLYISOPRENE POWDER-FREE SURGICAL GLOVES: Brand: PROTEXIS

## (undated) DEVICE — COLUMN DRAPE

## (undated) DEVICE — DRAIN RELIAVAC 100CC

## (undated) DEVICE — SOL  .9 1000ML BAG

## (undated) DEVICE — SUTURE SILK 3-0 KS

## (undated) DEVICE — ARM DRAPE

## (undated) DEVICE — GAUZE SPONGES,USP TYPE VII GAUZE, 12 PLY: Brand: CURITY

## (undated) DEVICE — SUTURE VICRYL 0 UR-6

## (undated) DEVICE — TIP COVER ACCESSORY

## (undated) DEVICE — CYSTO CDS-LF: Brand: MEDLINE INDUSTRIES, INC.

## (undated) DEVICE — PROGRASP FORCEPS: Brand: ENDOWRIST

## (undated) DEVICE — NITINOL WIRE STR 035

## (undated) DEVICE — AIRSEAL 12 MM ACCESS PORT AND PALM GRIP OBTURATOR WITH BLADELESS OPTICAL TIP, 120 MM LENGTH: Brand: AIRSEAL

## (undated) DEVICE — GOWN,SIRUS,FABRIC-REINFORCED,X-LARGE: Brand: MEDLINE

## (undated) DEVICE — LAP CHOLE/APPY CDS-LF: Brand: MEDLINE INDUSTRIES, INC.

## (undated) DEVICE — MONOPOLAR CURVED SCISSORS: Brand: ENDOWRIST

## (undated) DEVICE — KENDALL SCD EXPRESS SLEEVES, KNEE LENGTH, MEDIUM: Brand: KENDALL SCD

## (undated) DEVICE — GUIDEWIRE STIFF AMPL .035X145

## (undated) DEVICE — CHLORAPREP 26ML APPLICATOR

## (undated) DEVICE — TROCAR: Brand: KII SHIELDED BLADED ACCESS SYSTEM

## (undated) NOTE — LETTER
Lynn Manjarrez [address]  [place], [address] [place] [address]    Consent for Operation  Date: __________________                                Time: _______________    1.  I authorize the performance upon Orlando Taylor the following operation:  Pr revealed by the pictures or by descriptive texts accompanying them. If the procedure has been videotaped, the surgeon will obtain the original videotape. The hospital will not be responsible for storage or maintenance of this tape.   8. For the purpose of a THAT MY DOCTOR PROVIDED ME WITH THE ABOVE EXPLANATIONS, THAT ALL BLANKS OR STATEMENTS REQUIRING INSERTION OR COMPLETION WERE FILLED IN.     Signature of Patient:   ___________________________    When the patient is a minor or mentally incompetent to give co iii. All of the medicines I take (including prescriptions, herbal supplements, and pills I can buy without a prescription (including street drugs/illegal medications).  Failure to inform my anesthesiologist about these medicines may increase my risk of anes _____________________________________________________________________________  Anesthesiologist Signature     Date   Time  I have discussed the procedure and information above with the patient (or patient’s representative) and answered their questions.  The

## (undated) NOTE — ED AVS SNAPSHOT
Consuelo Wiley   MRN: [id number]    Department:  [facility] Emergency Department   Date of Visit:  2/16/2019           Disclosure     Insurance plans vary and the physician(s) referred by the ER may not be covered by your plan.  Please contact your tell this physician (or your personal doctor if your instructions are to return to your personal doctor) about any new or lasting problems. The primary care or specialist physician will see patients referred from the [facility] Emergency Department.  Edgar Hayes